# Patient Record
Sex: FEMALE | Race: ASIAN | NOT HISPANIC OR LATINO | URBAN - METROPOLITAN AREA
[De-identification: names, ages, dates, MRNs, and addresses within clinical notes are randomized per-mention and may not be internally consistent; named-entity substitution may affect disease eponyms.]

---

## 2023-01-19 ENCOUNTER — OFFICE (OUTPATIENT)
Dept: URBAN - METROPOLITAN AREA CLINIC 105 | Facility: CLINIC | Age: 23
End: 2023-01-19

## 2023-01-19 VITALS
HEIGHT: 63 IN | DIASTOLIC BLOOD PRESSURE: 59 MMHG | OXYGEN SATURATION: 99 % | WEIGHT: 121 LBS | SYSTOLIC BLOOD PRESSURE: 99 MMHG | HEART RATE: 91 BPM

## 2023-01-19 DIAGNOSIS — R14.0 ABDOMINAL DISTENSION (GASEOUS): ICD-10-CM

## 2023-01-19 DIAGNOSIS — R10.10 UPPER ABDOMINAL PAIN, UNSPECIFIED: ICD-10-CM

## 2023-01-19 DIAGNOSIS — K59.00 CONSTIPATION, UNSPECIFIED: ICD-10-CM

## 2023-01-19 DIAGNOSIS — K21.9 GASTRO-ESOPHAGEAL REFLUX DISEASE WITHOUT ESOPHAGITIS: ICD-10-CM

## 2023-01-19 PROCEDURE — 99203 OFFICE O/P NEW LOW 30 MIN: CPT

## 2023-01-19 NOTE — SERVICENOTES
– Ordered upper endoscopy with Bravo pH testing
– Ordered blood test
– Discussed low FODMAP diet for bloating
– Try FDgard as needed for abdominal pain, you can buy this in the drugstore or online if helpful
– Take a fiber supplement daily such as Citrucel, Benefiber or FiberChoice
– Try MiraLAX (polyethylene glycol) as needed for constipation

## 2023-01-19 NOTE — SERVICEHPINOTES
Saeed Mera   is seen for an initial visit today. Patient presents for evaluation of upper abdominal pain, gas/bloating, reflux and nausea/vomiting. Patient reports having stomach issues after eating for the last couple years. She reports having gas/bloating and belching. She has had these issues since she was in college. She has been unable to identify food triggers. She has been to urgent care on 2 occasions for nausea/vomiting. During those episodes she had 12 hours of nausea/vomiting. She tried omeprazole for 1 month but was unsure if it helped. She also has upper abdominal pain. She reports having anemia from heavy periods. She had H. pylori breath test that was negative. She continues to have issues with gas. She denies blood in stool or black stool. Her weight has been stable. She does report having some issues with constipation when she is traveling or during her period. She denies excess stress. She denies dysphagia or heartburn. She denies family history of colon cancer, inflammatory bowel disease or celiac disease.br
brPatient was seen by PCP in [place] 6/2022. She had no complaints at that time. Testing for gonorrhea and chlamydia were negative. CBC with mild anemia with hemoglobin 11.4, hematocrit 37 and MCV 82.8. Lipid panel normal. CMP normal. TSH normal. Hemoglobin A1c 5.2%. H. pylori breath test was negative. HIV negative. Hepatitis B and hepatitis C negative. RPR negative. Urinalysis normal.

## 2023-03-01 LAB
CELIAC AB TTG TIGA W/RFLX: IMMUNOGLOBULIN A, QN, SERUM: 220 MG/DL (ref 87–352)
CELIAC AB TTG TIGA W/RFLX: T-TRANSGLUTAMINASE (TTG) IGA: <2 U/ML